# Patient Record
Sex: FEMALE | Race: WHITE | NOT HISPANIC OR LATINO | URBAN - METROPOLITAN AREA
[De-identification: names, ages, dates, MRNs, and addresses within clinical notes are randomized per-mention and may not be internally consistent; named-entity substitution may affect disease eponyms.]

---

## 2021-02-08 ENCOUNTER — IMMUNIZATION (OUTPATIENT)
Dept: LAB | Age: 71
End: 2021-02-08

## 2021-02-08 DIAGNOSIS — Z23 NEED FOR VACCINATION: Primary | ICD-10-CM

## 2021-02-08 PROCEDURE — 0011A COVID-19 MODERNA VACCINE: CPT | Performed by: HOSPITALIST

## 2021-02-08 PROCEDURE — 91301 COVID-19 MODERNA VACCINE: CPT | Performed by: HOSPITALIST

## 2021-03-08 ENCOUNTER — IMMUNIZATION (OUTPATIENT)
Dept: LAB | Age: 71
End: 2021-03-08

## 2021-03-08 DIAGNOSIS — Z23 NEED FOR VACCINATION: Primary | ICD-10-CM

## 2021-03-08 PROCEDURE — 0012A COVID-19 MODERNA VACCINE: CPT

## 2021-03-08 PROCEDURE — 91301 COVID-19 MODERNA VACCINE: CPT

## 2023-12-01 ENCOUNTER — TELEPHONE (OUTPATIENT)
Dept: UROLOGY | Facility: CLINIC | Age: 73
End: 2023-12-01

## 2023-12-01 NOTE — TELEPHONE ENCOUNTER
Returned patient's  requesting to make YEARLY POST-OP APPT (SURGERY 10/21/19). [facility] TRANSFER for Dr. Tone Jain. [facility] for patient to call back to schedule appt.

## 2024-08-22 ENCOUNTER — TELEPHONE (OUTPATIENT)
Dept: UROLOGY | Facility: CLINIC | Age: 74
End: 2024-08-22

## 2024-08-23 NOTE — TELEPHONE ENCOUNTER
Returned patient's VM contacting us back regarding a change in physician's schedule for Dec 2024.  LVM with appointment updated Yearly appointment scheduled for 2/25/25 at 10:30am.  Also LVM with call back number if appointment needs to be rescheduled.

## 2024-09-06 ENCOUNTER — TELEPHONE (OUTPATIENT)
Dept: UROLOGY | Facility: CLINIC | Age: 74
End: 2024-09-06

## 2025-03-18 ENCOUNTER — TELEPHONE (OUTPATIENT)
Dept: UROLOGY | Facility: CLINIC | Age: 75
End: 2025-03-18

## 2025-03-25 ENCOUNTER — OFFICE VISIT (OUTPATIENT)
Dept: UROLOGY | Facility: CLINIC | Age: 75
End: 2025-03-25
Attending: OBSTETRICS & GYNECOLOGY
Payer: MEDICARE

## 2025-03-25 VITALS — SYSTOLIC BLOOD PRESSURE: 124 MMHG | TEMPERATURE: 98 F | WEIGHT: 180 LBS | DIASTOLIC BLOOD PRESSURE: 68 MMHG

## 2025-03-25 DIAGNOSIS — N95.2 POSTMENOPAUSAL ATROPHIC VAGINITIS: ICD-10-CM

## 2025-03-25 DIAGNOSIS — Z98.890 POST-OPERATIVE STATE: ICD-10-CM

## 2025-03-25 DIAGNOSIS — N81.84 PELVIC MUSCLE WASTING: Primary | ICD-10-CM

## 2025-03-25 PROCEDURE — 99202 OFFICE O/P NEW SF 15 MIN: CPT

## 2025-03-25 RX ORDER — TIZANIDINE 2 MG/1
TABLET ORAL
COMMUNITY
Start: 2024-12-22

## 2025-03-25 RX ORDER — CEPHALEXIN 500 MG/1
4 CAPSULE ORAL AS DIRECTED
COMMUNITY
Start: 2022-10-19

## 2025-03-25 RX ORDER — TRAMADOL HYDROCHLORIDE 50 MG/1
50 TABLET ORAL
COMMUNITY
Start: 2022-11-14 | End: 2025-03-25

## 2025-03-25 RX ORDER — DOCOSAHEXAENOIC ACID/EPA 120-180 MG
1200 CAPSULE ORAL AS DIRECTED
COMMUNITY

## 2025-03-25 RX ORDER — PREGABALIN 150 MG/1
CAPSULE ORAL
COMMUNITY
Start: 2025-03-19

## 2025-03-25 RX ORDER — ROPINIROLE 1 MG/1
1 TABLET, FILM COATED ORAL NIGHTLY
COMMUNITY
Start: 2025-02-03 | End: 2025-03-25

## 2025-03-25 RX ORDER — BENAZEPRIL HYDROCHLORIDE 40 MG/1
40 TABLET ORAL DAILY
COMMUNITY

## 2025-03-25 RX ORDER — TRAZODONE HYDROCHLORIDE 150 MG/1
150 TABLET ORAL NIGHTLY
COMMUNITY
Start: 2025-01-16

## 2025-03-25 RX ORDER — AMLODIPINE BESYLATE 10 MG/1
10 TABLET ORAL DAILY
COMMUNITY

## 2025-03-25 RX ORDER — RALOXIFENE HYDROCHLORIDE 60 MG/1
60 TABLET, FILM COATED ORAL DAILY
COMMUNITY

## 2025-03-25 RX ORDER — LOVASTATIN 40 MG/1
TABLET ORAL
COMMUNITY

## 2025-03-25 RX ORDER — ROPINIROLE 0.25 MG/1
0.75 TABLET, FILM COATED ORAL 2 TIMES DAILY
COMMUNITY
Start: 2025-03-19

## 2025-03-25 NOTE — PROGRESS NOTES
She is s/p Post-Op Summary  Procedure Date: 10/21/19  Procedure Name: Vaginal Hysterectomy, Bilateral Salpino-oophorectomy, Uterosacral Ligament Suspension, Anterior/Posterior/Enterocele Repair, Mid-urethral Sling, Cystoscopy  Post-Op Symptoms: Patient denies pain, FOE, UUI, prolapse symptoms, nausea/vomitting, fevers/chills, bleeding, voiding dysfunction, and defecatory dysfunction.  Do you feel your surgery was successful?: Very successful  Compared to before surgery are you?: Much better  How satisfied are you with the results of your surgery?: Completely satisfied    Doing well   no complaints  Voids freely  No UTIs   BMs reg  No Pain  Denies dyspareunia  No leakage  Vag estrogen twice weekly  Irreg with pelvic exercises  happy    /68   Temp 97.7 °F (36.5 °C)   Wt 180 lb (81.6 kg)     Gen: NAD  CV: RRR  Pulm:nl effort  Abd: soft  : tolerated vaginal exam. Suture site well healed. No active bleeding. Good support    Discussed mgmt of vulvovaginal atrophy with vaginal estrogen cream. Reviewed associated benefits, risks, alternatives, and goals. Recommend low dose twice weekly mgmt     Reviewed bowel management  Discussed daily pelvic exercises, info provided  Call with s/sx of UTI  Plan for follow up in 12 months, sooner prn    All questions answered  She understands and agrees to plan    Petra Lester DO